# Patient Record
Sex: FEMALE | Race: WHITE | Employment: FULL TIME | URBAN - METROPOLITAN AREA
[De-identification: names, ages, dates, MRNs, and addresses within clinical notes are randomized per-mention and may not be internally consistent; named-entity substitution may affect disease eponyms.]

---

## 2019-08-31 ENCOUNTER — HOSPITAL ENCOUNTER (EMERGENCY)
Age: 54
Discharge: HOME | End: 2019-08-31
Attending: EMERGENCY MEDICINE
Payer: COMMERCIAL

## 2019-08-31 VITALS
BODY MASS INDEX: 21.28 KG/M2 | RESPIRATION RATE: 16 BRPM | TEMPERATURE: 98.7 F | DIASTOLIC BLOOD PRESSURE: 93 MMHG | WEIGHT: 135.58 LBS | SYSTOLIC BLOOD PRESSURE: 146 MMHG | HEART RATE: 66 BPM | HEIGHT: 67 IN | OXYGEN SATURATION: 99 %

## 2019-08-31 DIAGNOSIS — S30.1XXA CONTUSION OF ABDOMINAL WALL, INITIAL ENCOUNTER: Primary | ICD-10-CM

## 2019-08-31 PROCEDURE — 99281 EMR DPT VST MAYX REQ PHY/QHP: CPT

## 2019-08-31 NOTE — ED TRIAGE NOTES
Pt. Olivia Branch to pt. 1st today after having bruising on stomach for a couple weeks. Denies any injury to area. Denies any N/V/D or blood in bowel or urine.      Pt. States he was doing self injections for hormones (in stomach) stopped on July 29, 2019

## 2019-08-31 NOTE — ED PROVIDER NOTES
58-year-old female with history of recent estrogen hormonal injections to her bilateral lower quadrants of her abdomen in July presents to the emergency department for evaluation of periumbilical abdominal bruising that occurred after that. She stated that after she received the hormone injections she had a heavy menstrual period but that has since stopped and she denies any further vaginal bleeding, hematuria, blood in stool, or any other bleeding. She denies any other bruising noted to her body. She denies any trauma to the area other than her recent hormonal injections. Denies any history of bleeding or clotting disorders. Denies taking any blood thinning medications, aspirin. She denies any abdominal pain, nausea, vomiting, diarrhea, dysuria, hematuria. She was seen initially at [facility] clinic prior to arrival and concern was raised for possible cullens/mccarty Egan sign. No past medical history on file. No past surgical history on file. No family history on file.     Social History     Socioeconomic History    Marital status: Not on file     Spouse name: Not on file    Number of children: Not on file    Years of education: Not on file    Highest education level: Not on file   Occupational History    Not on file   Social Needs    Financial resource strain: Not on file    Food insecurity:     Worry: Not on file     Inability: Not on file    Transportation needs:     Medical: Not on file     Non-medical: Not on file   Tobacco Use    Smoking status: Never Smoker    Smokeless tobacco: Never Used   Substance and Sexual Activity    Alcohol use: Yes     Comment: weekends    Drug use: Never    Sexual activity: Not on file   Lifestyle    Physical activity:     Days per week: Not on file     Minutes per session: Not on file    Stress: Not on file   Relationships    Social connections:     Talks on phone: Not on file     Gets together: Not on file     Attends Worship service: Not on file     Active member of club or organization: Not on file     Attends meetings of clubs or organizations: Not on file     Relationship status: Not on file    Intimate partner violence:     Fear of current or ex partner: Not on file     Emotionally abused: Not on file     Physically abused: Not on file     Forced sexual activity: Not on file   Other Topics Concern    Not on file   Social History Narrative    Not on file         ALLERGIES: Patient has no known allergies. Review of Systems   Constitutional: Negative for activity change, appetite change, chills and fever. HENT: Negative for congestion, rhinorrhea, sinus pressure, sneezing and sore throat. Eyes: Negative for photophobia and visual disturbance. Respiratory: Negative for cough and shortness of breath. Cardiovascular: Negative for chest pain. Gastrointestinal: Negative for abdominal pain, blood in stool, constipation, diarrhea, nausea and vomiting. Genitourinary: Negative for difficulty urinating, dysuria, flank pain, frequency, hematuria, menstrual problem, urgency, vaginal bleeding and vaginal discharge. Musculoskeletal: Negative for arthralgias, back pain, myalgias and neck pain. Skin: Positive for color change (Bruising to her lower abdomen). Negative for rash and wound. Neurological: Negative for syncope, weakness, numbness and headaches. Psychiatric/Behavioral: Negative for self-injury and suicidal ideas. All other systems reviewed and are negative. Vitals:    08/31/19 1112   BP: (!) 146/93   Pulse: 66   Resp: 16   Temp: 98.7 °F (37.1 °C)   SpO2: 99%   Weight: 61.5 kg (135 lb 9.3 oz)   Height: 5' 7\" (1.702 m)            Physical Exam   Constitutional: She is oriented to person, place, and time. She appears well-developed and well-nourished. No distress. HENT:   Head: Normocephalic and atraumatic.    Nose: Nose normal.   Mouth/Throat: Oropharynx is clear and moist.   Eyes: Pupils are equal, round, and reactive to light. Conjunctivae and EOM are normal.   Neck: Neck supple. Cardiovascular: Normal rate, regular rhythm, normal heart sounds and intact distal pulses. Pulmonary/Chest: Effort normal and breath sounds normal.   Abdominal: Soft. She exhibits no distension. There is no tenderness. There is no rigidity, no rebound, no guarding, no CVA tenderness, no tenderness at McBurney's point and negative Jaimes's sign. Small area of healing bruising with areas of dark purple as well as the area surrounding of green suggesting breakdown of the bruise. Nontender   Musculoskeletal: She exhibits no edema or tenderness. Neurological: She is alert and oriented to person, place, and time. She has normal strength. No cranial nerve deficit or sensory deficit. Coordination normal. GCS eye subscore is 4. GCS verbal subscore is 5. GCS motor subscore is 6. Skin: Skin is warm and dry. She is not diaphoretic. No other bruising other than that which is on her abdomen. No bruising to the flanks or back or extremities. No purpura or petechiae. Nursing note and vitals reviewed. MDM    53 y.o. female presents with lower abdominal/periumbilical ecchymosis after she recently had hormonal injections. She was sent to the emergency department given concern for possible Yvrose sign. The patient's symptoms have been present for a couple of weeks and have been evolving showing evidence of healing bruising. She has no abdominal pain, rigidity, nausea, vomiting, diarrhea, or any other symptoms suggestive of possible pancreatitis as Youngwood's sign would be potentially indicative of. Have a very low suspicion for any acute intra-abdominal abnormality given history and exam.  Do not feel that laboratory or imaging evaluation is necessary at this time. Feel this is likely just healing contusion from her previous abdominal injections. Reassurance was given.   She was recommended PCP follow-up as needed, return precautions were given for worsening or concerns with specific emphasis on GI symptoms suggestive of pancreatitis, or with worsening nontraumatic ecchymosis in other areas.     Procedures

## 2019-08-31 NOTE — ED NOTES
Dr. Blu Teixeira and I have reviewed discharge instructions with the patient. The patient verbalized understanding.